# Patient Record
Sex: FEMALE | Race: BLACK OR AFRICAN AMERICAN | HISPANIC OR LATINO | Employment: UNEMPLOYED | URBAN - METROPOLITAN AREA
[De-identification: names, ages, dates, MRNs, and addresses within clinical notes are randomized per-mention and may not be internally consistent; named-entity substitution may affect disease eponyms.]

---

## 2020-02-17 ENCOUNTER — OFFICE VISIT (OUTPATIENT)
Dept: URGENT CARE | Age: 30
End: 2020-02-17
Payer: COMMERCIAL

## 2020-02-17 VITALS
WEIGHT: 246.6 LBS | HEART RATE: 84 BPM | OXYGEN SATURATION: 97 % | BODY MASS INDEX: 39.63 KG/M2 | RESPIRATION RATE: 20 BRPM | SYSTOLIC BLOOD PRESSURE: 130 MMHG | HEIGHT: 66 IN | DIASTOLIC BLOOD PRESSURE: 85 MMHG | TEMPERATURE: 98.4 F

## 2020-02-17 DIAGNOSIS — J06.9 ACUTE URI: Primary | ICD-10-CM

## 2020-02-17 PROCEDURE — 99283 EMERGENCY DEPT VISIT LOW MDM: CPT | Performed by: PHYSICIAN ASSISTANT

## 2020-02-17 PROCEDURE — G0382 LEV 3 HOSP TYPE B ED VISIT: HCPCS | Performed by: PHYSICIAN ASSISTANT

## 2020-02-17 PROCEDURE — 99203 OFFICE O/P NEW LOW 30 MIN: CPT | Performed by: PHYSICIAN ASSISTANT

## 2020-02-17 RX ORDER — ALBUTEROL SULFATE 90 UG/1
AEROSOL, METERED RESPIRATORY (INHALATION)
COMMUNITY
End: 2021-11-12

## 2020-02-17 RX ORDER — FLUTICASONE PROPIONATE 50 MCG
1 SPRAY, SUSPENSION (ML) NASAL DAILY
Qty: 16 G | Refills: 0 | Status: SHIPPED | OUTPATIENT
Start: 2020-02-17

## 2020-02-17 RX ORDER — LORATADINE 10 MG/1
10 TABLET ORAL DAILY
Qty: 30 TABLET | Refills: 0 | Status: SHIPPED | OUTPATIENT
Start: 2020-02-17

## 2020-02-17 RX ORDER — ALBUTEROL SULFATE 90 UG/1
2 AEROSOL, METERED RESPIRATORY (INHALATION) EVERY 6 HOURS PRN
Qty: 18 G | Refills: 0 | Status: SHIPPED | OUTPATIENT
Start: 2020-02-17 | End: 2020-06-01

## 2020-02-17 NOTE — PROGRESS NOTES
[facility] Now        NAME: Kristen Mai is a 34 y o  female  : 1990    MRN: [id number]  DATE: 2020  TIME: 3:55 PM    Assessment and Plan   Acute URI [J06 9]  1  Acute URI  albuterol (Ventolin HFA) 90 mcg/act inhaler    fluticasone (FLONASE) 50 mcg/act nasal spray    loratadine (CLARITIN) 10 mg tablet         Patient Instructions       Continue to monitor symptoms  If new or worsening symptoms develop, go immediately to Er  Drink plenty of fluids  Follow up with Family Doctor this week  Chief Complaint     Chief Complaint   Patient presents with    Cold Like Symptoms     Pt started two days ago with wheezing, moist cough, SOB, chills, body aches, and wheezing  Pt states her Albuterol   Pt denies fevers, nausea, vomiting or diarrhea  History of Present Illness       Cough   This is a new problem  Episode onset: 2 days ago  The problem has been gradually worsening  The problem occurs every few minutes  The cough is productive of brown sputum  Associated symptoms include nasal congestion, postnasal drip, rhinorrhea and a sore throat  Pertinent negatives include no chest pain, chills, ear pain, fever, headaches, myalgias, rash, shortness of breath or wheezing  Nothing aggravates the symptoms  Treatments tried: Ran out of her albuterol  Has not tried anything else for sx  Her past medical history is significant for asthma  Review of Systems   Review of Systems   Constitutional: Negative for chills, diaphoresis, fatigue and fever  HENT: Positive for postnasal drip, rhinorrhea, sinus pressure, sinus pain, sneezing and sore throat  Negative for congestion, ear pain and voice change  Eyes: Negative  Respiratory: Positive for cough  Negative for chest tightness, shortness of breath and wheezing  Cardiovascular: Negative for chest pain and palpitations  Gastrointestinal: Negative for abdominal pain, constipation, diarrhea, nausea and vomiting  Endocrine: Negative  Genitourinary: Negative for dysuria  Musculoskeletal: Negative for back pain, myalgias and neck pain  Skin: Negative for pallor and rash  Allergic/Immunologic: Negative  Neurological: Negative for dizziness, syncope and headaches  Hematological: Negative  Psychiatric/Behavioral: Negative  Current Medications       Current Outpatient Medications:     albuterol (Proventil HFA) 90 mcg/act inhaler, Inhale, Disp: , Rfl:     albuterol (Ventolin HFA) 90 mcg/act inhaler, Inhale 2 puffs every 6 (six) hours as needed for wheezing, Disp: 18 g, Rfl: 0    fluticasone (FLONASE) 50 mcg/act nasal spray, 1 spray into each nostril daily, Disp: 16 g, Rfl: 0    loratadine (CLARITIN) 10 mg tablet, Take 1 tablet (10 mg total) by mouth daily, Disp: 30 tablet, Rfl: 0    Current Allergies     Allergies as of 2020    (No Known Allergies)            The following portions of the patient's history were reviewed and updated as appropriate: allergies, current medications, past family history, past medical history, past social history, past surgical history and problem list      Past Medical History:   Diagnosis Date    Asthma     Ptosis        Past Surgical History:   Procedure Laterality Date     SECTION         History reviewed  No pertinent family history  Medications have been verified  Objective   /85   Pulse 84   Temp 98 4 °F (36 9 °C)   Resp 20   Ht 5' 6" (1 676 m)   Wt 112 kg (246 lb 9 6 oz)   SpO2 97%   BMI 39 80 kg/m²        Physical Exam     Physical Exam   Constitutional: She appears well-developed and well-nourished  No distress  HENT:   Head: Normocephalic and atraumatic  Right Ear: External ear normal    Left Ear: External ear normal    Eyes: Conjunctivae are normal  Right eye exhibits no discharge  Left eye exhibits no discharge  Neck: Normal range of motion  Neck supple     Cardiovascular: Normal rate, regular rhythm, normal heart sounds and intact distal pulses  Pulmonary/Chest: Effort normal and breath sounds normal  No respiratory distress  She has no wheezes  She has no rales  Lymphadenopathy:     She has no cervical adenopathy  Skin: Skin is warm  Capillary refill takes less than 2 seconds  No rash noted  She is not diaphoretic  Nursing note and vitals reviewed

## 2020-02-17 NOTE — PATIENT INSTRUCTIONS
Continue to monitor symptoms  If new or worsening symptoms develop, go immediately to Er  Drink plenty of fluids  Follow up with Family Doctor this week  Cold Symptoms   WHAT YOU NEED TO KNOW:   A cold is an infection caused by a virus  The infection causes your upper respiratory system to become inflamed  Common symptoms of a cold include sneezing, dry throat, a stuffy nose, headache, watery eyes, and a cough  Your cough may be dry, or you may cough up mucus  You may also have muscle aches, joint pain, and tiredness  Rarely, you may have a fever  Most colds go away without treatment  DISCHARGE INSTRUCTIONS:   Return to the emergency department if:   · You have increased tiredness and weakness  · You are unable to eat  · Your heart is beating much faster than usual for you  · You see white spots in the back of your throat and your neck is swollen and sore to the touch  · You see pinpoint or larger reddish-purple dots on your skin  Contact your healthcare provider if:   · You have a fever higher than 102°F (38 9°C)  · You have new or worsening shortness of breath  · You have thick nasal drainage for more than 2 days  · Your symptoms do not improve or get worse within 5 days  · You have questions or concerns about your condition or care  Medicines: The following medicines may be suggested by your healthcare provider to decrease your cold symptoms  These medicines are available without a doctor's order  Ask which medicines to take and when to take them  Follow directions  · NSAIDs or acetaminophen  help to bring down a fever or decrease pain  · Decongestants  help decrease nasal stuffiness  · Antihistamines  help decrease sneezing and a runny nose  · Cough suppressants  help decrease how much you cough  · Expectorants  help loosen mucus so you can cough it up  · Take your medicine as directed    Contact your healthcare provider if you think your medicine is not helping or if you have side effects  Tell him of her if you are allergic to any medicine  Keep a list of the medicines, vitamins, and herbs you take  Include the amounts, and when and why you take them  Bring the list or the pill bottles to follow-up visits  Carry your medicine list with you in case of an emergency  Symptom relief: The following may help relieve cold symptoms, such as a dry throat and congestion:  · Gargle with mouthwash or warm salt water as directed  · Suck on throat lozenges or hard candy  · Use a cold or warm vaporizer or humidifier to ease your breathing  · Rest for at least 2 days and then as needed to decrease tiredness and weakness  · Use petroleum based jelly around your nostrils to decrease irritation from blowing your nose  Drink liquids:  Liquids will help thin and loosen thick mucus so you can cough it up  Liquids will also keep you hydrated  Ask your healthcare provider which liquids are best for you and how much to drink each day  Prevent the spread of germs: You can spread your cold germs to others for at least 3 days after your symptoms start  Wash your hands often  Do not share items, such as eating utensils  Cover your nose and mouth when you cough or sneeze using the crook of your elbow instead of your hands  Throw used tissues in the garbage  Do not smoke:  Smoking may worsen your symptoms and increase the length of time you feel sick  Talk with your healthcare provider if you need help to stop smoking  Follow up with your healthcare provider as directed:  Write down your questions so you remember to ask them during your visits  © 2017 [address] Information is for End User's use only and may not be sold, redistributed or otherwise used for commercial purposes  All illustrations and images included in CareNotes® are the copyrighted property of [employer]  or [employer]  The above information is an  only  It is not intended as medical advice for individual conditions or treatments  Talk to your doctor, nurse or pharmacist before following any medical regimen to see if it is safe and effective for you

## 2020-06-01 ENCOUNTER — OFFICE VISIT (OUTPATIENT)
Dept: URGENT CARE | Age: 30
End: 2020-06-01
Payer: COMMERCIAL

## 2020-06-01 VITALS
TEMPERATURE: 99.3 F | SYSTOLIC BLOOD PRESSURE: 117 MMHG | BODY MASS INDEX: 40.05 KG/M2 | OXYGEN SATURATION: 97 % | RESPIRATION RATE: 20 BRPM | HEART RATE: 71 BPM | DIASTOLIC BLOOD PRESSURE: 73 MMHG | HEIGHT: 66 IN | WEIGHT: 249.2 LBS

## 2020-06-01 DIAGNOSIS — J45.20 MILD INTERMITTENT ASTHMA, UNSPECIFIED WHETHER COMPLICATED: Primary | ICD-10-CM

## 2020-06-01 PROCEDURE — 99283 EMERGENCY DEPT VISIT LOW MDM: CPT | Performed by: NURSE PRACTITIONER

## 2020-06-01 PROCEDURE — G0382 LEV 3 HOSP TYPE B ED VISIT: HCPCS | Performed by: NURSE PRACTITIONER

## 2020-06-01 PROCEDURE — 99213 OFFICE O/P EST LOW 20 MIN: CPT | Performed by: NURSE PRACTITIONER

## 2020-06-01 RX ORDER — ALBUTEROL SULFATE 90 UG/1
2 AEROSOL, METERED RESPIRATORY (INHALATION) EVERY 6 HOURS PRN
Qty: 1 INHALER | Refills: 0 | Status: SHIPPED | OUTPATIENT
Start: 2020-06-01

## 2020-06-01 RX ORDER — ALBUTEROL SULFATE 2.5 MG/3ML
2.5 SOLUTION RESPIRATORY (INHALATION) ONCE
Qty: 3 ML | Refills: 0 | Status: SHIPPED | OUTPATIENT
Start: 2020-06-01 | End: 2020-06-01

## 2020-06-01 RX ORDER — OMEPRAZOLE 40 MG/1
40 CAPSULE, DELAYED RELEASE ORAL DAILY
COMMUNITY
Start: 2019-12-03

## 2020-09-17 ENCOUNTER — OFFICE VISIT (OUTPATIENT)
Dept: URGENT CARE | Age: 30
End: 2020-09-17
Payer: COMMERCIAL

## 2020-09-17 VITALS
HEART RATE: 81 BPM | SYSTOLIC BLOOD PRESSURE: 151 MMHG | TEMPERATURE: 98 F | BODY MASS INDEX: 40.02 KG/M2 | RESPIRATION RATE: 20 BRPM | DIASTOLIC BLOOD PRESSURE: 80 MMHG | HEIGHT: 66 IN | WEIGHT: 249 LBS | OXYGEN SATURATION: 100 %

## 2020-09-17 DIAGNOSIS — K08.89 PAIN, DENTAL: Primary | ICD-10-CM

## 2020-09-17 PROCEDURE — 99213 OFFICE O/P EST LOW 20 MIN: CPT | Performed by: PHYSICIAN ASSISTANT

## 2020-09-17 PROCEDURE — G0382 LEV 3 HOSP TYPE B ED VISIT: HCPCS | Performed by: PHYSICIAN ASSISTANT

## 2020-09-17 PROCEDURE — 99283 EMERGENCY DEPT VISIT LOW MDM: CPT | Performed by: PHYSICIAN ASSISTANT

## 2020-09-17 RX ORDER — IBUPROFEN 600 MG/1
600 TABLET ORAL EVERY 6 HOURS PRN
Qty: 30 TABLET | Refills: 0 | Status: SHIPPED | OUTPATIENT
Start: 2020-09-17

## 2020-09-17 RX ORDER — CLINDAMYCIN HYDROCHLORIDE 300 MG/1
300 CAPSULE ORAL 4 TIMES DAILY
Qty: 40 CAPSULE | Refills: 0 | Status: SHIPPED | OUTPATIENT
Start: 2020-09-17 | End: 2020-09-27

## 2020-09-17 RX ORDER — KETOROLAC TROMETHAMINE 30 MG/ML
30 INJECTION, SOLUTION INTRAMUSCULAR; INTRAVENOUS ONCE
Status: COMPLETED | OUTPATIENT
Start: 2020-09-17 | End: 2020-09-17

## 2020-09-17 RX ADMIN — KETOROLAC TROMETHAMINE 30 MG: 30 INJECTION, SOLUTION INTRAMUSCULAR; INTRAVENOUS at 09:14

## 2020-09-17 NOTE — PATIENT INSTRUCTIONS
Toothache   WHAT YOU NEED TO KNOW:   A toothache is pain that is caused by irritation of the nerves in the center of your tooth  The irritation may be caused by several problems, such as a cavity, an infection, a cracked tooth, or gum disease  It is very important to follow up with your dentist so the cause of your toothache can be diagnosed and treated  This can help prevent more serious problems  DISCHARGE INSTRUCTIONS:   Medicines: You may  need any of the following:  · NSAIDs  decrease swelling and pain  This medicine can be bought with or without a doctor's order  This medicine can cause stomach bleeding or kidney problems in certain people  If you take blood thinner medicine, always ask your healthcare provider if NSAIDs are safe for you  Always read the medicine label and follow the directions on it before using this medicine  · Acetaminophen  decreases pain  It is available without a doctor's order  Ask how much to take and how often to take it  Follow directions  Acetaminophen can cause liver damage if not taken correctly  · Pain medicine  may be given as a pill or as medicine that you put directly on your tooth or gums  Do not wait until the pain is severe before you take this medicine  · Antibiotics  help fight or prevent an infection caused by bacteria  Take them as directed  · Take your medicine as directed  Contact your healthcare provider if you think your medicine is not helping or if you have side effects  Tell him of her if you are allergic to any medicine  Keep a list of the medicines, vitamins, and herbs you take  Include the amounts, and when and why you take them  Bring the list or the pill bottles to follow-up visits  Carry your medicine list with you in case of an emergency  Follow up with your dentist as directed: You may be referred to a dental surgeon  Write down your questions so you remember to ask them during your visits     Self-care:   · Rinse your mouth with warm salt water 4 times a day or as directed  · You may need to eat soft foods to help relieve pain caused by chewing  Contact your dentist if:   · You have questions or concerns about your condition or care  Return to the emergency department if:   · You have trouble breathing  · You have swelling in your face or neck  · You have a fever and chills  · You have trouble speaking or swallowing  · You have trouble opening or closing your mouth  © 2017 [facility] Information is for End User's use only and may not be sold, redistributed or otherwise used for commercial purposes  All illustrations and images included in CareNotes® are the copyrighted property of [employer]  or [employer]  The above information is an  only  It is not intended as medical advice for individual conditions or treatments  Talk to your doctor, nurse or pharmacist before following any medical regimen to see if it is safe and effective for you

## 2020-09-17 NOTE — PROGRESS NOTES
[facility]        NAME: Prem Munoz is a 31 y o  female  : 1990    MRN: [id number]  DATE: 2020  TIME: 9:16 AM    /80   Pulse 81   Temp 98 °F (36 7 °C)   Resp 20   Ht 5' 6" (1 676 m)   Wt 113 kg (249 lb)   LMP 2020 (Exact Date)   SpO2 100%   BMI 40 19 kg/m²     Assessment and Plan   Pain, dental [K08 89]  1  Pain, dental  ketorolac (TORADOL) injection 30 mg    clindamycin (CLEOCIN) 300 MG capsule    ibuprofen (MOTRIN) 600 mg tablet         Patient Instructions       Follow up with PCP in 3-5 days  Proceed to  ER if symptoms worsen  Chief Complaint     Chief Complaint   Patient presents with    Dental Pain     pt has c/o pain in her upper jaw line  pt states she has an appt tomorrow at 9 am with her dentist but cant take the pain  her mouth is throbbing  History of Present Illness       Pt with dental pain for several days    Dental Pain    This is a new problem  The current episode started in the past 7 days  The problem occurs constantly  The problem has been unchanged  The pain is moderate  Associated symptoms include facial pain  Pertinent negatives include no difficulty swallowing, fever, oral bleeding, sinus pressure or thermal sensitivity  She has tried nothing for the symptoms  The treatment provided no relief  Review of Systems   Review of Systems   Constitutional: Negative  Negative for fever  HENT: Positive for dental problem  Negative for sinus pressure  Eyes: Negative  Respiratory: Negative  Cardiovascular: Negative  Gastrointestinal: Negative  Endocrine: Negative  Genitourinary: Negative  Musculoskeletal: Negative  Skin: Negative  Allergic/Immunologic: Negative  Neurological: Negative  Hematological: Negative  Psychiatric/Behavioral: Negative  All other systems reviewed and are negative          Current Medications       Current Outpatient Medications:     albuterol (Proventil HFA) 90 mcg/act inhaler, Inhale, Disp: , Rfl:     albuterol (PROVENTIL HFA,VENTOLIN HFA) 90 mcg/act inhaler, Inhale 2 puffs every 6 (six) hours as needed for wheezing, Disp: 1 Inhaler, Rfl: 0    clindamycin (CLEOCIN) 300 MG capsule, Take 1 capsule (300 mg total) by mouth 4 (four) times a day for 10 days, Disp: 40 capsule, Rfl: 0    fluticasone (FLONASE) 50 mcg/act nasal spray, 1 spray into each nostril daily, Disp: 16 g, Rfl: 0    ibuprofen (MOTRIN) 600 mg tablet, Take 1 tablet (600 mg total) by mouth every 6 (six) hours as needed for mild pain, Disp: 30 tablet, Rfl: 0    loratadine (CLARITIN) 10 mg tablet, Take 1 tablet (10 mg total) by mouth daily, Disp: 30 tablet, Rfl: 0    omeprazole (PriLOSEC) 40 MG capsule, Take 40 mg by mouth daily, Disp: , Rfl:   No current facility-administered medications for this visit  Current Allergies     Allergies as of 2020    (No Known Allergies)            The following portions of the patient's history were reviewed and updated as appropriate: allergies, current medications, past family history, past medical history, past social history, past surgical history and problem list      Past Medical History:   Diagnosis Date    Asthma     GERD (gastroesophageal reflux disease)     Ptosis        Past Surgical History:   Procedure Laterality Date     SECTION         History reviewed  No pertinent family history  Medications have been verified  Objective   /80   Pulse 81   Temp 98 °F (36 7 °C)   Resp 20   Ht 5' 6" (1 676 m)   Wt 113 kg (249 lb)   LMP 2020 (Exact Date)   SpO2 100%   BMI 40 19 kg/m²        Physical Exam     Physical Exam  Vitals signs and nursing note reviewed  Constitutional:       Appearance: Normal appearance  She is normal weight  HENT:      Head: Normocephalic        Right Ear: Tympanic membrane, ear canal and external ear normal       Left Ear: Tympanic membrane, ear canal and external ear normal       Nose: Nose normal       Mouth/Throat:      Mouth: Mucous membranes are moist       Pharynx: Oropharynx is clear  Comments: Dental pain left upper 1st and 2nd molar   Eyes:      Extraocular Movements: Extraocular movements intact  Conjunctiva/sclera: Conjunctivae normal       Pupils: Pupils are equal, round, and reactive to light  Neck:      Musculoskeletal: Normal range of motion and neck supple  Cardiovascular:      Rate and Rhythm: Normal rate and regular rhythm  Pulses: Normal pulses  Heart sounds: Normal heart sounds  Pulmonary:      Effort: Pulmonary effort is normal       Breath sounds: Normal breath sounds  Abdominal:      General: Abdomen is flat  Bowel sounds are normal    Musculoskeletal: Normal range of motion  Skin:     General: Skin is warm  Capillary Refill: Capillary refill takes less than 2 seconds  Neurological:      General: No focal deficit present  Mental Status: She is alert and oriented to person, place, and time  Psychiatric:         Mood and Affect: Mood normal          Behavior: Behavior normal          Thought Content:  Thought content normal

## 2020-09-22 ENCOUNTER — TELEPHONE (OUTPATIENT)
Dept: URGENT CARE | Age: 30
End: 2020-09-22

## 2020-09-22 DIAGNOSIS — K21.9 GASTROESOPHAGEAL REFLUX DISEASE WITHOUT ESOPHAGITIS: Primary | ICD-10-CM

## 2020-09-22 RX ORDER — OMEPRAZOLE 20 MG/1
20 CAPSULE, DELAYED RELEASE ORAL DAILY
Qty: 30 CAPSULE | Refills: 0 | Status: SHIPPED | OUTPATIENT
Start: 2020-09-22 | End: 2020-10-22

## 2020-09-22 NOTE — TELEPHONE ENCOUNTER
Pt with gerd feeling with cleocin , will call in omeprazole to [facility] [address]    Pt declines going to er for evaluation  Just want gerd medicine

## 2021-11-12 ENCOUNTER — APPOINTMENT (EMERGENCY)
Dept: RADIOLOGY | Facility: HOSPITAL | Age: 31
End: 2021-11-12
Payer: COMMERCIAL

## 2021-11-12 ENCOUNTER — HOSPITAL ENCOUNTER (EMERGENCY)
Facility: HOSPITAL | Age: 31
Discharge: HOME | End: 2021-11-12
Attending: EMERGENCY MEDICINE
Payer: COMMERCIAL

## 2021-11-12 VITALS
BODY MASS INDEX: 41.78 KG/M2 | WEIGHT: 260 LBS | RESPIRATION RATE: 22 BRPM | SYSTOLIC BLOOD PRESSURE: 151 MMHG | OXYGEN SATURATION: 97 % | HEIGHT: 66 IN | DIASTOLIC BLOOD PRESSURE: 104 MMHG | HEART RATE: 102 BPM | TEMPERATURE: 98.9 F

## 2021-11-12 DIAGNOSIS — J06.9 URI WITH COUGH AND CONGESTION: Primary | ICD-10-CM

## 2021-11-12 DIAGNOSIS — J45.901 ASTHMA EXACERBATION: ICD-10-CM

## 2021-11-12 PROCEDURE — 99284 EMERGENCY DEPT VISIT MOD MDM: CPT | Performed by: EMERGENCY MEDICINE

## 2021-11-12 PROCEDURE — U0005 INFEC AGEN DETEC AMPLI PROBE: HCPCS | Performed by: EMERGENCY MEDICINE

## 2021-11-12 PROCEDURE — 71045 X-RAY EXAM CHEST 1 VIEW: CPT

## 2021-11-12 PROCEDURE — 94640 AIRWAY INHALATION TREATMENT: CPT

## 2021-11-12 PROCEDURE — 99283 EMERGENCY DEPT VISIT LOW MDM: CPT

## 2021-11-12 PROCEDURE — U0003 INFECTIOUS AGENT DETECTION BY NUCLEIC ACID (DNA OR RNA); SEVERE ACUTE RESPIRATORY SYNDROME CORONAVIRUS 2 (SARS-COV-2) (CORONAVIRUS DISEASE [COVID-19]), AMPLIFIED PROBE TECHNIQUE, MAKING USE OF HIGH THROUGHPUT TECHNOLOGIES AS DESCRIBED BY CMS-2020-01-R: HCPCS | Performed by: EMERGENCY MEDICINE

## 2021-11-12 RX ORDER — ALBUTEROL SULFATE 90 UG/1
2 AEROSOL, METERED RESPIRATORY (INHALATION) EVERY 4 HOURS PRN
Qty: 18 G | Refills: 0 | Status: SHIPPED | OUTPATIENT
Start: 2021-11-12 | End: 2021-11-12 | Stop reason: SDUPTHER

## 2021-11-12 RX ORDER — ALBUTEROL SULFATE 2.5 MG/3ML
2.5 SOLUTION RESPIRATORY (INHALATION) EVERY 6 HOURS PRN
Qty: 75 ML | Refills: 0 | Status: SHIPPED | OUTPATIENT
Start: 2021-11-12

## 2021-11-12 RX ORDER — PREDNISONE 10 MG/1
50 TABLET ORAL DAILY
Qty: 25 TABLET | Refills: 0 | Status: SHIPPED | OUTPATIENT
Start: 2021-11-12 | End: 2021-11-12 | Stop reason: SDUPTHER

## 2021-11-12 RX ORDER — PREDNISONE 10 MG/1
50 TABLET ORAL DAILY
Qty: 25 TABLET | Refills: 0 | Status: SHIPPED | OUTPATIENT
Start: 2021-11-12 | End: 2021-12-01

## 2021-11-12 RX ORDER — ALBUTEROL SULFATE 2.5 MG/3ML
5 SOLUTION RESPIRATORY (INHALATION) ONCE
Status: COMPLETED | OUTPATIENT
Start: 2021-11-12 | End: 2021-11-12

## 2021-11-12 RX ORDER — ALBUTEROL SULFATE 90 UG/1
2 AEROSOL, METERED RESPIRATORY (INHALATION) EVERY 4 HOURS PRN
Qty: 18 G | Refills: 0 | Status: SHIPPED | OUTPATIENT
Start: 2021-11-12

## 2021-11-12 RX ADMIN — IPRATROPIUM BROMIDE 0.5 MG: 0.5 SOLUTION RESPIRATORY (INHALATION) at 12:59

## 2021-11-12 RX ADMIN — IPRATROPIUM BROMIDE 0.5 MG: 0.5 SOLUTION RESPIRATORY (INHALATION) at 15:27

## 2021-11-12 RX ADMIN — ALBUTEROL SULFATE 5 MG: 2.5 SOLUTION RESPIRATORY (INHALATION) at 15:27

## 2021-11-12 RX ADMIN — ALBUTEROL SULFATE 5 MG: 2.5 SOLUTION RESPIRATORY (INHALATION) at 12:59

## 2021-11-12 RX ADMIN — PREDNISONE 50 MG: 20 TABLET ORAL at 13:00

## 2021-11-13 LAB — SARS-COV-2 RNA RESP QL NAA+PROBE: NEGATIVE

## 2021-12-01 ENCOUNTER — OFFICE VISIT (OUTPATIENT)
Dept: URGENT CARE | Age: 31
End: 2021-12-01
Payer: COMMERCIAL

## 2021-12-01 VITALS
SYSTOLIC BLOOD PRESSURE: 135 MMHG | TEMPERATURE: 99.4 F | RESPIRATION RATE: 20 BRPM | HEART RATE: 80 BPM | WEIGHT: 260 LBS | DIASTOLIC BLOOD PRESSURE: 80 MMHG | OXYGEN SATURATION: 100 % | BODY MASS INDEX: 41.78 KG/M2 | HEIGHT: 66 IN

## 2021-12-01 DIAGNOSIS — L30.1 DYSHIDROTIC ECZEMA: Primary | ICD-10-CM

## 2021-12-01 DIAGNOSIS — L30.9 ECZEMA, UNSPECIFIED TYPE: ICD-10-CM

## 2021-12-01 PROCEDURE — G0382 LEV 3 HOSP TYPE B ED VISIT: HCPCS | Performed by: PHYSICIAN ASSISTANT

## 2021-12-01 RX ORDER — PREDNISONE 10 MG/1
TABLET ORAL
Qty: 30 TABLET | Refills: 0 | Status: SHIPPED | OUTPATIENT
Start: 2021-12-01 | End: 2021-12-13

## 2022-05-20 ENCOUNTER — OFFICE VISIT (OUTPATIENT)
Dept: OBGYN CLINIC | Facility: CLINIC | Age: 32
End: 2022-05-20
Payer: COMMERCIAL

## 2022-05-20 VITALS
HEART RATE: 77 BPM | BODY MASS INDEX: 39.55 KG/M2 | HEIGHT: 67 IN | DIASTOLIC BLOOD PRESSURE: 70 MMHG | SYSTOLIC BLOOD PRESSURE: 104 MMHG | OXYGEN SATURATION: 98 % | WEIGHT: 252 LBS | TEMPERATURE: 99.5 F

## 2022-05-20 DIAGNOSIS — E28.2 PCOS (POLYCYSTIC OVARIAN SYNDROME): ICD-10-CM

## 2022-05-20 DIAGNOSIS — Z12.4 SCREENING FOR CERVICAL CANCER: ICD-10-CM

## 2022-05-20 DIAGNOSIS — Z01.419 ENCOUNTER FOR ANNUAL ROUTINE GYNECOLOGICAL EXAMINATION: Primary | ICD-10-CM

## 2022-05-20 DIAGNOSIS — Z11.3 ROUTINE SCREENING FOR STI (SEXUALLY TRANSMITTED INFECTION): ICD-10-CM

## 2022-05-20 PROCEDURE — 99385 PREV VISIT NEW AGE 18-39: CPT | Performed by: OBSTETRICS & GYNECOLOGY

## 2022-05-20 PROCEDURE — 87591 N.GONORRHOEAE DNA AMP PROB: CPT | Performed by: OBSTETRICS & GYNECOLOGY

## 2022-05-20 PROCEDURE — G0476 HPV COMBO ASSAY CA SCREEN: HCPCS | Performed by: OBSTETRICS & GYNECOLOGY

## 2022-05-20 PROCEDURE — G0145 SCR C/V CYTO,THINLAYER,RESCR: HCPCS | Performed by: OBSTETRICS & GYNECOLOGY

## 2022-05-20 PROCEDURE — 87491 CHLMYD TRACH DNA AMP PROBE: CPT | Performed by: OBSTETRICS & GYNECOLOGY

## 2022-05-20 NOTE — ASSESSMENT & PLAN NOTE
- Discussed ACOG guidelines for pap smear screening frequency: performed today  - Discussed healthy lifestyle recommendations for diet, exercise and self breast awareness   - Discussed ACOG recommendations for screening mammograms: not indicated today  - Discussed age based recommendations for adequate calcium and vitamin D intake  No additional osteoporosis screening indicated at this time  - Discussed ACOG recommendations for colon cancer screening: not indicated at this time  - Safe sex practices were discussed and STI testing was desired by the patient  - Contraceptive options were reviewed: trying to conceive, see PCOS discussion   - Routine follow up in 1 year was recommended or sooner as needed  All questions and concerns were addressed

## 2022-05-20 NOTE — PATIENT INSTRUCTIONS
Reviewed that the fertile interval in each cycle is approximately six days and includes the five days prior to ovulation plus the day of ovulation, and that this can vary among women  Reviewed that the highest probability of conception occurs when intercourse takes place one to two days prior to ovulation and on the day of ovulation  Reviewed that the highest pregnancy rates occur in couples who have intercourse every one to two days during this time  [facility] [address] [facility], [id number]  [phone]  [place], [address] [facility]  [address], [address] [facility], [address] [facility]  [address] [facility] [address], [facility] [address]  [phone]  [facility]

## 2022-05-20 NOTE — ASSESSMENT & PLAN NOTE
Reviewed etiology of PCOS and goals of therapy which include: improvement of hyperandrogenic features, management of underlying metabolic abnormalities to reduce future risk of type 2 diabetes and cardiovascular disease, prevention of endometrial/ovarian cancer from chronic anovulation, contraception/cycle regulation for those not pursuing pregnancy, and ovulation induction for those who desire pregnancy  We reviewed the importance of diet and exercise for weight reduction in overweight/obese women  We reviewed that women with PCOS may still intermittently ovulate and that a form of contraception is advised if she does not desire pregnancy  We reviewed that combined estrogen-progestin oral contraceptives are first-line therapy for menstrual dysfunction, endometrial protection, and androgen excess  Discussed with irregular cycles and lack of spontaneous conception in 12 months that TONI evaluation is warranted  Discussed importance of weight loss for maintaining ovulatory status  Pt inquiring about whether ovarian cysts could impact fertility - reviewed that polycystic appearance of ovaries is a result of anovulation, pelvic US ordered per pt request due to her concern about this  Reviewed basic infertility workup and provided contact information for local offices   Timed intercourse and basic preconception counseling provided

## 2022-05-20 NOTE — ASSESSMENT & PLAN NOTE
Patient desires screening for sexually transmitted infections  Testing sent for gonorrhea, chlamydia, and trichomonas  Patient also desires testing for HIV, hepatitis B, hepatitis C, and syphilis  Safe sex practices including condom use reviewed

## 2022-05-20 NOTE — PROGRESS NOTES
Modesto Levine is a 36 y o  female who presents for annual exam       Chief Complaint   Patient presents with   [facility] pt    Gynecologic Exam     Pt states that she has been trying to conceive for about 1 yr, was dx w/PCOS as a teenager  One pregnancy/one child 4 yrs ago     New GYN   H/o irregular menses  Was told PCOS in the past  Used to have excessive hair growth, acne   Menses irregular since teenage years - between 2 weeks-4 months apart  Regulated on its own after that  No menses 2021-2022  But was also on chronic prednisone for severe eczema in that time  Regular since March, not excessively heavy or painful     Son born in   No trouble conceiving  Did lose some weight at that time - weighed 230-240 lbs  Father of her son is different from current partner  Current partner has a 15year old daughter  Healthy  No drug use  Smokes cigarettes  Currently trying to conceive for past year   Labs in  (TSH, PRL E2, FSH, DHEA-S, total testosterone) unremarkable     Last Pap: 2022 unknown     HPV vaccine completed:no  Current contraception: none  History of abnormal Pap smear: no      Family history of uterine or ovarian cancer: no  Family history of breast cancer: maternal aunt - breast cancer, thinks premenopausal   Family history of colon cancer: no      Menstrual History:  OB History        1    Para   1    Term   1       0    AB   0    Living   1       SAB   0    IAB   0    Ectopic   0    Multiple   0    Live Births   1                    Patient's last menstrual period was 2022 (exact date)    Period Pattern: (!) Irregular      Past Medical History:   Diagnosis Date    Asthma     Eczema     GERD (gastroesophageal reflux disease)     Ptosis      Past Surgical History:   Procedure Laterality Date     SECTION       Family History   Problem Relation Age of Onset    Breast cancer Maternal Aunt     Colon cancer Neg Hx     Ovarian cancer Neg Hx        Social History     Tobacco Use    Smoking status: Former Smoker     Types: Cigarettes    Smokeless tobacco: Never Used   Vaping Use    Vaping Use: Never used   Substance Use Topics    Alcohol use: Yes     Comment: rare    Drug use: Never          Current Outpatient Medications:     albuterol (2 5 mg/3 mL) 0 083 % nebulizer solution, Take 3 mL (2 5 mg total) by nebulization every 6 (six) hours as needed for wheezing or shortness of breath, Disp: 75 mL, Rfl: 0    albuterol (Proventil HFA) 90 mcg/act inhaler, Inhale 2 puffs every 4 (four) hours as needed for wheezing, Disp: 18 g, Rfl: 0    triamcinolone (KENALOG) 0 1 % ointment, Apply topically 2 (two) times a day (Patient taking differently: Apply topically 2 (two) times a day as needed), Disp: 30 g, Rfl: 0    albuterol (PROVENTIL HFA,VENTOLIN HFA) 90 mcg/act inhaler, Inhale 2 puffs every 6 (six) hours as needed for wheezing (Patient not taking: No sig reported), Disp: 1 Inhaler, Rfl: 0    fluticasone (FLONASE) 50 mcg/act nasal spray, 1 spray into each nostril daily (Patient not taking: No sig reported), Disp: 16 g, Rfl: 0    ibuprofen (MOTRIN) 600 mg tablet, Take 1 tablet (600 mg total) by mouth every 6 (six) hours as needed for mild pain (Patient not taking: No sig reported), Disp: 30 tablet, Rfl: 0    loratadine (CLARITIN) 10 mg tablet, Take 1 tablet (10 mg total) by mouth daily (Patient not taking: No sig reported), Disp: 30 tablet, Rfl: 0    omeprazole (PriLOSEC) 40 MG capsule, Take 40 mg by mouth daily (Patient not taking: No sig reported), Disp: , Rfl:     No Known Allergies        Review of Systems   Constitutional: Negative for appetite change, chills and fever  Eyes: Negative for visual disturbance  Respiratory: Negative for cough, chest tightness and shortness of breath  Cardiovascular: Negative for chest pain     Gastrointestinal: Negative for abdominal distention, abdominal pain, constipation, diarrhea, nausea and vomiting  Endocrine: Negative for cold intolerance and heat intolerance  Genitourinary: Negative for difficulty urinating, dyspareunia, dysuria, frequency, genital sores, pelvic pain, urgency, vaginal bleeding, vaginal discharge and vaginal pain  Musculoskeletal: Negative for arthralgias  Neurological: Negative for light-headedness and headaches  Hematological: Does not bruise/bleed easily  Psychiatric/Behavioral: Negative for behavioral problems  All other systems reviewed and are negative  /70 (BP Location: Left arm, Patient Position: Sitting, Cuff Size: Large)   Pulse 77   Temp 99 5 °F (37 5 °C) (Tympanic)   Ht 5' 6 5" (1 689 m)   Wt 114 kg (252 lb)   LMP 05/06/2022 (Exact Date)   SpO2 98%   Breastfeeding No   BMI 40 06 kg/m²         Physical Exam  Constitutional:       General: She is not in acute distress  Appearance: Normal appearance  She is obese  Genitourinary:      Vulva, bladder and urethral meatus normal       No lesions in the vagina  Right Labia: No rash, tenderness, lesions or skin changes  Left Labia: No tenderness, lesions, skin changes or rash  No labial fusion noted  No inguinal adenopathy present in the right or left side  No vaginal discharge, erythema, tenderness, bleeding or ulceration  No vaginal prolapse present  Right Adnexa: not tender, not full and no mass present  Left Adnexa: not tender, not full and no mass present  No cervical motion tenderness, discharge, friability, lesion or polyp  Uterus is not enlarged, fixed, tender or irregular  No uterine mass detected  Uterus is midaxial       Pelvic exam was performed with patient in the lithotomy position  Breasts:      Right: No swelling, bleeding, inverted nipple, mass, nipple discharge, skin change, tenderness, axillary adenopathy or supraclavicular adenopathy        Left: No swelling, bleeding, inverted nipple, mass, nipple discharge, skin change, tenderness, axillary adenopathy or supraclavicular adenopathy  HENT:      Head: Normocephalic and atraumatic  Neck:      Thyroid: No thyromegaly  Cardiovascular:      Rate and Rhythm: Normal rate and regular rhythm  Pulmonary:      Effort: Pulmonary effort is normal  No accessory muscle usage or respiratory distress  Abdominal:      General: There is no distension  Palpations: Abdomen is soft  Tenderness: There is no abdominal tenderness  There is no guarding or rebound  Musculoskeletal:         General: Normal range of motion  Cervical back: Normal range of motion and neck supple  Lymphadenopathy:      Upper Body:      Right upper body: No supraclavicular or axillary adenopathy  Left upper body: No supraclavicular or axillary adenopathy  Lower Body: No right inguinal and no right inguinal adenopathy  No left inguinal and no left inguinal adenopathy  Neurological:      General: No focal deficit present  Mental Status: She is alert  Skin:     General: Skin is warm and dry  Findings: No erythema  Psychiatric:         Mood and Affect: Mood normal          Behavior: Behavior normal    Vitals and nursing note reviewed  Exam conducted with a chaperone present  Encounter for annual routine gynecological examination  - Discussed ACOG guidelines for pap smear screening frequency: performed today  - Discussed healthy lifestyle recommendations for diet, exercise and self breast awareness   - Discussed ACOG recommendations for screening mammograms: not indicated today  - Discussed age based recommendations for adequate calcium and vitamin D intake  No additional osteoporosis screening indicated at this time  - Discussed ACOG recommendations for colon cancer screening: not indicated at this time    - Safe sex practices were discussed and STI testing was desired by the patient  - Contraceptive options were reviewed: trying to conceive, see PCOS discussion   - Routine follow up in 1 year was recommended or sooner as needed  All questions and concerns were addressed  Routine screening for STI (sexually transmitted infection)  Patient desires screening for sexually transmitted infections  Testing sent for gonorrhea, chlamydia, and trichomonas  Patient also desires testing for HIV, hepatitis B, hepatitis C, and syphilis  Safe sex practices including condom use reviewed  PCOS (polycystic ovarian syndrome)  Reviewed etiology of PCOS and goals of therapy which include: improvement of hyperandrogenic features, management of underlying metabolic abnormalities to reduce future risk of type 2 diabetes and cardiovascular disease, prevention of endometrial/ovarian cancer from chronic anovulation, contraception/cycle regulation for those not pursuing pregnancy, and ovulation induction for those who desire pregnancy  We reviewed the importance of diet and exercise for weight reduction in overweight/obese women  We reviewed that women with PCOS may still intermittently ovulate and that a form of contraception is advised if she does not desire pregnancy  We reviewed that combined estrogen-progestin oral contraceptives are first-line therapy for menstrual dysfunction, endometrial protection, and androgen excess  Discussed with irregular cycles and lack of spontaneous conception in 12 months that TONI evaluation is warranted  Discussed importance of weight loss for maintaining ovulatory status  Pt inquiring about whether ovarian cysts could impact fertility - reviewed that polycystic appearance of ovaries is a result of anovulation, pelvic US ordered per pt request due to her concern about this  Reviewed basic infertility workup and provided contact information for local offices   Timed intercourse and basic preconception counseling provided

## 2022-05-24 LAB
C TRACH DNA SPEC QL NAA+PROBE: NEGATIVE
HPV HR 12 DNA CVX QL NAA+PROBE: NEGATIVE
HPV16 DNA CVX QL NAA+PROBE: NEGATIVE
HPV18 DNA CVX QL NAA+PROBE: NEGATIVE
N GONORRHOEA DNA SPEC QL NAA+PROBE: NEGATIVE

## 2022-05-27 LAB
LAB AP GYN PRIMARY INTERPRETATION: NORMAL
Lab: NORMAL

## 2022-08-01 ENCOUNTER — OFFICE VISIT (OUTPATIENT)
Dept: URGENT CARE | Age: 32
End: 2022-08-01
Payer: COMMERCIAL

## 2022-08-01 VITALS
OXYGEN SATURATION: 96 % | WEIGHT: 242.2 LBS | TEMPERATURE: 98 F | HEIGHT: 67 IN | RESPIRATION RATE: 20 BRPM | DIASTOLIC BLOOD PRESSURE: 72 MMHG | SYSTOLIC BLOOD PRESSURE: 118 MMHG | HEART RATE: 85 BPM | BODY MASS INDEX: 38.01 KG/M2

## 2022-08-01 DIAGNOSIS — M70.71 BURSITIS OF OTHER BURSA OF RIGHT HIP: ICD-10-CM

## 2022-08-01 DIAGNOSIS — M72.2 PLANTAR FASCIITIS, RIGHT: Primary | ICD-10-CM

## 2022-08-01 PROCEDURE — 99283 EMERGENCY DEPT VISIT LOW MDM: CPT | Performed by: FAMILY MEDICINE

## 2022-08-01 PROCEDURE — G0382 LEV 3 HOSP TYPE B ED VISIT: HCPCS | Performed by: FAMILY MEDICINE

## 2022-08-01 RX ORDER — PREDNISONE 50 MG/1
50 TABLET ORAL DAILY
Qty: 5 TABLET | Refills: 0 | Status: SHIPPED | OUTPATIENT
Start: 2022-08-01 | End: 2022-08-06

## 2022-08-01 NOTE — LETTER
August 1, 2022     Patient: Donovan Perez   YOB: 1990   Date of Visit: 8/1/2022       To Whom it May Concern:    Donovan Perez was seen in my clinic on 8/1/2022  If you have any questions or concerns, please don't hesitate to call           Sincerely,          KEN BLACK        CC: No Recipients

## 2022-08-01 NOTE — PATIENT INSTRUCTIONS
No ibuprofen while taking prednisone  Several times a day, stretch out the bottom of the foot has demonstrated in the office  Recommend wearing a good pair of sneakers with arch support both at work and at home  Ice 3 to 4 times a day with a frozen water bottle applying pressure  If no significant improvement in 2 weeks, follow-up with podiatrist   Follow up with PCP in 3-5 days  Proceed to  ER if symptoms worsen  Plantar Fasciitis   AMBULATORY CARE:   Plantar fasciitis  PF is swelling of the plantar fascia  The plantar fascia is a thick band of tissue that connects your heel bone to your toes  This part of your foot helps support the arch of your foot and absorbs shock  Tension and stress can cause small tears on the thick band of tissue  These small tears can grow larger with repeated stretching and tearing  The band of tissue can become swollen and painful  Signs and symptoms:   Pain on the bottom of your foot near your heel    Pain that is worse right after you get out of bed or after a long period of rest    Pain after activity    Stiffness in your foot    Heel swelling    Call your doctor if:   Your pain or swelling suddenly increase  You develop knee, hip, or back pain  You have questions or concerns about your condition or care  Treatment  may include any of the following:  Medicines  may be given to decrease swelling and pain  Shoe inserts, splints, or tape  help support your foot and decrease stress on your plantar fascia  A night splint may help stretch your plantar fascia while you sleep  Stretches and exercises  can help decrease pain and swelling  They can also help strengthen the muscles that support your heel and foot  Surgery  may be needed when other treatments do not work  During surgery, the plantar fascia is  from your heel  Self-care:   Wear your splint or shoe inserts as directed    You may need to wear a splint at night to keep your foot stretched while you sleep  This will help prevent sharp pain first thing in the morning  Shoe inserts will help decrease stress on your plantar fascia when you walk or exercise  Apply ice on your plantar fascia  Ice helps prevent tissue damage and decreases swelling and pain  Fill a water bottle with water and freeze it  Wrap a towel around the bottle or cover it with a pillow case  Roll the water bottle under your foot for 10 minutes in the morning and after work  Massage your plantar fascia as directed  This may help decrease swelling and pain  Roll a golf ball under your foot for 10 minutes  Repeat 3 times each day  Go to physical therapy as directed  A physical therapist teaches you exercises to help improve movement and strength, and to decrease pain  Prevent plantar fasciitis:   Maintain a healthy weight  This will help decrease stress on your feet  Ask your healthcare provider how much you should weigh  Ask him to help you create a weight loss plan if you are overweight  Do low-impact exercises  Low-impact exercises decrease stress on your plantar fascia  Examples include swimming or bicycling  Start new activities slowly  Increase the intensity and time gradually  Wear shoes that fit well and support your arch  Replace your shoes before the padding or shock absorption wears out  Do not walk or  bare feet or sandals for long periods of time  Follow up with your doctor as directed:  Write down your questions so you remember to ask them during your visits  © Copyright [employer] 2022 Information is for End User's use only and may not be sold, redistributed or otherwise used for commercial purposes  All illustrations and images included in CareNotes® are the copyrighted property of A [employer] A M , Inc  or [facility] Adriana Pierre   The above information is an  only  It is not intended as medical advice for individual conditions or treatments   Talk to your doctor, nurse or pharmacist before following any medical regimen to see if it is safe and effective for you

## 2022-08-01 NOTE — PROGRESS NOTES
[facility]        NAME: Pepper Smith is a 29 y o  female  : 1990    MRN: [id number]  DATE: 2022  TIME: 10:50 AM    Assessment and Plan   Plantar fasciitis, right [M72 2]  1  Plantar fasciitis, right  predniSONE 50 mg tablet   2  Bursitis of other bursa of right hip  predniSONE 50 mg tablet         Patient Instructions     No ibuprofen while taking prednisone  Several times a day, stretch out the bottom of the foot has demonstrated in the office  Recommend wearing a good pair of sneakers with arch support both at work and at home  Ice 3 to 4 times a day with a frozen water bottle applying pressure  If no significant improvement in 2 weeks, follow-up with podiatrist   Follow up with PCP in 3-5 days  Proceed to  ER if symptoms worsen  Chief Complaint     Chief Complaint   Patient presents with    Foot Pain     Pt reports two week hx of worsening right posterior foot pain  Pt stand 8-10 hrs per day at work  Taking Ibuprofen   Hip Pain     Pt repots ongoing right hip pain  Denies trauma/injury  Applied ice and taking ibuprofen  History of Present Illness       Foot Pain (Pt reports two week hx of worsening right posterior foot pain  Pt stand 8-10 hrs per day at work  Douglas Antes Ibuprofen   )  Hip Pain (Pt repots ongoing right hip pain  Denies trauma/injury   Applied ice and taking ibuprofen   )  Pain is worse after she has been resting for a while and she goes to step on it  Slightly improves after she has moderate on her foot for a while  Occasional Motrin which does calm down a little bit, but pain is still there  She is not using any ice  Hip Pain         Review of Systems   Review of Systems   Constitutional: Negative  Respiratory: Negative  Cardiovascular: Negative  Musculoskeletal:        Right foot pain  Right hip pain           Current Medications       Current Outpatient Medications:     albuterol (2 5 mg/3 mL) 0 083 % nebulizer solution, Take 3 mL (2 5 mg total) by nebulization every 6 (six) hours as needed for wheezing or shortness of breath, Disp: 75 mL, Rfl: 0    albuterol (Proventil HFA) 90 mcg/act inhaler, Inhale 2 puffs every 4 (four) hours as needed for wheezing, Disp: 18 g, Rfl: 0    predniSONE 50 mg tablet, Take 1 tablet (50 mg total) by mouth daily for 5 days, Disp: 5 tablet, Rfl: 0    triamcinolone (KENALOG) 0 1 % ointment, Apply topically 2 (two) times a day (Patient taking differently: Apply topically 2 (two) times a day as needed), Disp: 30 g, Rfl: 0    albuterol (PROVENTIL HFA,VENTOLIN HFA) 90 mcg/act inhaler, Inhale 2 puffs every 6 (six) hours as needed for wheezing (Patient not taking: No sig reported), Disp: 1 Inhaler, Rfl: 0    fluticasone (FLONASE) 50 mcg/act nasal spray, 1 spray into each nostril daily (Patient not taking: No sig reported), Disp: 16 g, Rfl: 0    ibuprofen (MOTRIN) 600 mg tablet, Take 1 tablet (600 mg total) by mouth every 6 (six) hours as needed for mild pain (Patient not taking: No sig reported), Disp: 30 tablet, Rfl: 0    loratadine (CLARITIN) 10 mg tablet, Take 1 tablet (10 mg total) by mouth daily (Patient not taking: No sig reported), Disp: 30 tablet, Rfl: 0    omeprazole (PriLOSEC) 40 MG capsule, Take 40 mg by mouth daily (Patient not taking: No sig reported), Disp: , Rfl:     Current Allergies     Allergies as of 2022    (No Known Allergies)            The following portions of the patient's history were reviewed and updated as appropriate: allergies, current medications, past family history, past medical history, past social history, past surgical history and problem list      Past Medical History:   Diagnosis Date    Asthma     Eczema     GERD (gastroesophageal reflux disease)     Ptosis        Past Surgical History:   Procedure Laterality Date     SECTION         Family History   Problem Relation Age of Onset    Breast cancer Maternal Aunt     Colon cancer Neg Hx     Ovarian cancer Neg Hx          Medications have been verified  Objective   /72   Pulse 85   Temp 98 °F (36 7 °C)   Resp 20   Ht 5' 6 5" (1 689 m)   Wt 110 kg (242 lb 3 2 oz)   LMP 07/29/2022   SpO2 96%   BMI 38 51 kg/m²   Patient's last menstrual period was 07/29/2022  Physical Exam     Physical Exam  Vitals and nursing note reviewed  Constitutional:       Appearance: Normal appearance  She is well-developed  Cardiovascular:      Rate and Rhythm: Normal rate and regular rhythm  Pulmonary:      Effort: Pulmonary effort is normal       Breath sounds: Normal breath sounds  Musculoskeletal:      Right hip: Tenderness present  Decreased range of motion  Left hip: Normal         Legs:    Neurological:      Mental Status: She is alert

## 2022-08-15 LAB
EXTERNAL HIV SCREEN: NORMAL
HBA1C MFR BLD HPLC: 5.5 %
HCV AB SER-ACNC: NEGATIVE

## 2022-10-11 PROBLEM — Z11.3 ROUTINE SCREENING FOR STI (SEXUALLY TRANSMITTED INFECTION): Status: RESOLVED | Noted: 2022-05-20 | Resolved: 2022-10-11

## 2022-11-03 ENCOUNTER — OFFICE VISIT (OUTPATIENT)
Dept: URGENT CARE | Age: 32
End: 2022-11-03

## 2022-11-03 VITALS
RESPIRATION RATE: 17 BRPM | TEMPERATURE: 99 F | DIASTOLIC BLOOD PRESSURE: 52 MMHG | HEIGHT: 66 IN | BODY MASS INDEX: 40.02 KG/M2 | SYSTOLIC BLOOD PRESSURE: 106 MMHG | WEIGHT: 249 LBS | OXYGEN SATURATION: 97 % | HEART RATE: 71 BPM

## 2022-11-03 DIAGNOSIS — N12 PYELONEPHRITIS: Primary | ICD-10-CM

## 2022-11-03 DIAGNOSIS — M54.50 ACUTE LEFT-SIDED LOW BACK PAIN WITHOUT SCIATICA: ICD-10-CM

## 2022-11-03 LAB
SL AMB  POCT GLUCOSE, UA: NEGATIVE
SL AMB LEUKOCYTE ESTERASE,UA: ABNORMAL
SL AMB POCT BILIRUBIN,UA: NEGATIVE
SL AMB POCT BLOOD,UA: NEGATIVE
SL AMB POCT CLARITY,UA: ABNORMAL
SL AMB POCT COLOR,UA: YELLOW
SL AMB POCT KETONES,UA: NEGATIVE
SL AMB POCT NITRITE,UA: NEGATIVE
SL AMB POCT PH,UA: 5
SL AMB POCT SPECIFIC GRAVITY,UA: 1.02
SL AMB POCT URINE HCG: NEGATIVE
SL AMB POCT URINE PROTEIN: NEGATIVE
SL AMB POCT UROBILINOGEN: 0.2

## 2022-11-03 RX ORDER — CEPHALEXIN 500 MG/1
500 CAPSULE ORAL EVERY 8 HOURS SCHEDULED
Qty: 30 CAPSULE | Refills: 0 | Status: SHIPPED | OUTPATIENT
Start: 2022-11-03 | End: 2022-11-13

## 2022-11-03 RX ORDER — PHENAZOPYRIDINE HYDROCHLORIDE 100 MG/1
100 TABLET, FILM COATED ORAL 3 TIMES DAILY PRN
Qty: 10 TABLET | Refills: 0 | Status: SHIPPED | OUTPATIENT
Start: 2022-11-03

## 2022-11-03 RX ORDER — IBUPROFEN 400 MG/1
800 TABLET ORAL ONCE
Status: COMPLETED | OUTPATIENT
Start: 2022-11-03 | End: 2022-11-03

## 2022-11-03 RX ADMIN — IBUPROFEN 800 MG: 400 TABLET ORAL at 12:39

## 2022-11-03 NOTE — PATIENT INSTRUCTIONS
Take antibiotics as prescribed  If symptoms do not improve in the next 24 hours on antibiotics, report to the emergency department  If symptoms worsen or you develop fever or chills, report to the emergency department

## 2022-11-03 NOTE — PROGRESS NOTES
[id number] [employer]        NAME: Columba Bean is a 29 y o  female  : 1990    MRN: [id number]  DATE: November 3, 2022  TIME: 12:40 PM    Assessment and Plan   Pyelonephritis [N12]  1  Pyelonephritis     2  Acute left-sided low back pain without sciatica  POCT urine dip    POCT urine HCG    Urine culture    cephalexin (KEFLEX) 500 mg capsule    phenazopyridine (PYRIDIUM) 100 mg tablet    ibuprofen (MOTRIN) tablet 800 mg    CANCELED: Urine culture   Pt presents with symptoms consistent with uncomplicated pyelonephritis vs nephrolithiasis  UA results are suggestive of pyelonephritis  Pt will be started on Keflex empirically to treat 3 times daily for 10 days duration  Will send urine for culture and notify her if there is any resistance  Patient will report to the emergency room if no improvement or symptoms the next 24 hours on antibiotics  She will report to the emergency room sooner if symptoms worsen  Pt provided with 800 mg Ibuprofen in clinic for pain  Patient Instructions   Patient Instructions   Take antibiotics as prescribed  If symptoms do not improve in the next 24 hours on antibiotics, report to the emergency department  If symptoms worsen or you develop fever or chills, report to the emergency department  Follow up with PCP in 3-5 days  Proceed to  ER if symptoms worsen  Chief Complaint     Chief Complaint   Patient presents with   • Back Pain     L low back/flank pain for approx 2 wks, urinary frequency  Possibly from twisting wrong  History of Present Illness       29year old female presents with complaints of left sided low back pain and flank pain for 2 weeks and frequency for 1 week  Pt denies abdominal pain, vomiting, diarrhea, fevers, chills, and back pain  She notes some nausea and intermittent dizziness  Pt denies recent changes in sexual partners and is not concerned for STI at this time  She has no other concerns of complaints today         Review of Systems   Review of Systems   Constitutional: Negative for chills and fever  Gastrointestinal: Negative for abdominal distention, abdominal pain, diarrhea, nausea and vomiting  Genitourinary: Positive for flank pain and frequency  Negative for dysuria and urgency  Musculoskeletal: Positive for back pain           Current Medications       Current Outpatient Medications:   •  albuterol (2 5 mg/3 mL) 0 083 % nebulizer solution, Take 3 mL (2 5 mg total) by nebulization every 6 (six) hours as needed for wheezing or shortness of breath, Disp: 75 mL, Rfl: 0  •  albuterol (Proventil HFA) 90 mcg/act inhaler, Inhale 2 puffs every 4 (four) hours as needed for wheezing, Disp: 18 g, Rfl: 0  •  cephalexin (KEFLEX) 500 mg capsule, Take 1 capsule (500 mg total) by mouth every 8 (eight) hours for 10 days, Disp: 30 capsule, Rfl: 0  •  phenazopyridine (PYRIDIUM) 100 mg tablet, Take 1 tablet (100 mg total) by mouth 3 (three) times a day as needed for bladder spasms, Disp: 10 tablet, Rfl: 0  •  albuterol (PROVENTIL HFA,VENTOLIN HFA) 90 mcg/act inhaler, Inhale 2 puffs every 6 (six) hours as needed for wheezing (Patient not taking: No sig reported), Disp: 1 Inhaler, Rfl: 0  •  fluticasone (FLONASE) 50 mcg/act nasal spray, 1 spray into each nostril daily (Patient not taking: No sig reported), Disp: 16 g, Rfl: 0  •  ibuprofen (MOTRIN) 600 mg tablet, Take 1 tablet (600 mg total) by mouth every 6 (six) hours as needed for mild pain (Patient not taking: No sig reported), Disp: 30 tablet, Rfl: 0  •  loratadine (CLARITIN) 10 mg tablet, Take 1 tablet (10 mg total) by mouth daily (Patient not taking: No sig reported), Disp: 30 tablet, Rfl: 0  •  omeprazole (PriLOSEC) 40 MG capsule, Take 40 mg by mouth daily (Patient not taking: No sig reported), Disp: , Rfl:   •  triamcinolone (KENALOG) 0 1 % ointment, Apply topically 2 (two) times a day (Patient not taking: Reported on 11/3/2022), Disp: 30 g, Rfl: 0  No current facility-administered medications for this visit  Current Allergies     Allergies as of 2022   • (No Known Allergies)            The following portions of the patient's history were reviewed and updated as appropriate: allergies, current medications, past family history, past medical history, past social history, past surgical history and problem list      Past Medical History:   Diagnosis Date   • Asthma    • Eczema    • GERD (gastroesophageal reflux disease)    • Ptosis        Past Surgical History:   Procedure Laterality Date   •  SECTION         Family History   Problem Relation Age of Onset   • Breast cancer Maternal Aunt    • Colon cancer Neg Hx    • Ovarian cancer Neg Hx          Medications have been verified  Objective   /52   Pulse 71   Temp 99 °F (37 2 °C)   Resp 17   Ht 5' 6" (1 676 m)   Wt 113 kg (249 lb)   LMP 2022   SpO2 97%   BMI 40 19 kg/m²   Patient's last menstrual period was 2022  Physical Exam     Physical Exam  Vitals and nursing note reviewed  Constitutional:       General: She is awake  She is not in acute distress  Appearance: Normal appearance  She is well-developed and well-groomed  She is not ill-appearing, toxic-appearing or diaphoretic  HENT:      Head: Normocephalic and atraumatic  Right Ear: Hearing and external ear normal       Left Ear: Hearing and external ear normal    Eyes:      General: Lids are normal  Vision grossly intact  Gaze aligned appropriately  Cardiovascular:      Rate and Rhythm: Normal rate and regular rhythm  Heart sounds: No murmur heard  No friction rub  No gallop  Pulmonary:      Effort: Pulmonary effort is normal       Breath sounds: Normal breath sounds  No wheezing, rhonchi or rales  Abdominal:      General: Abdomen is flat  Bowel sounds are normal       Palpations: Abdomen is soft  Tenderness: There is no abdominal tenderness  There is left CVA tenderness   There is no right CVA tenderness, guarding or rebound  Musculoskeletal:      Cervical back: Normal range of motion  Skin:     General: Skin is warm and dry  Neurological:      Mental Status: She is alert and oriented to person, place, and time  Coordination: Coordination is intact  Gait: Gait is intact  Psychiatric:         Attention and Perception: Attention and perception normal          Mood and Affect: Mood and affect normal          Speech: Speech normal          Behavior: Behavior normal  Behavior is cooperative  Note: Portions of this record may have been created with voice recognition software  Occasional wrong word or "sound a like" substitutions may have occurred due to the inherent limitations of voice recognition software  Please read the chart carefully and recognize, using context, where substitutions have occurred  *

## 2022-11-05 LAB
BACTERIA UR CULT: ABNORMAL
BACTERIA UR CULT: ABNORMAL